# Patient Record
Sex: MALE | Race: WHITE | NOT HISPANIC OR LATINO | Employment: FULL TIME | URBAN - METROPOLITAN AREA
[De-identification: names, ages, dates, MRNs, and addresses within clinical notes are randomized per-mention and may not be internally consistent; named-entity substitution may affect disease eponyms.]

---

## 2017-10-09 ENCOUNTER — PATIENT OUTREACH (OUTPATIENT)
Dept: ADMINISTRATIVE | Facility: HOSPITAL | Age: 32
End: 2017-10-09

## 2017-10-31 ENCOUNTER — PATIENT OUTREACH (OUTPATIENT)
Dept: ADMINISTRATIVE | Facility: HOSPITAL | Age: 32
End: 2017-10-31

## 2017-10-31 NOTE — LETTER
October 31, 2017    Jamie Peter  [address] [facility] [address]             [facility]  [address]  [facility] [address]  Phone: [phone] Dear Mr. Peter:    We have tried to reach you by [facility] email unsuccessfully.    [facility] is committed to your overall health.  Our records indicate that you are due for an annual checkup with your primary care provider,  Dr. Wright.  Please call [phone] to schedule a routine physical exam. You may also be due for the following test and/or procedures:    Tetanus immunization  Influenza vaccine    If you have had any of the above done at another facility, please let us know by calling [phone] so that we can update your record.  We will add these results to your chart if you fax them to the fax number listed below.  If you have any questions, please call [phone].       Sincerely,    Deepa Lopez  Clinical Care Coordinator  [facility] [address] [place] [address]  Phone: [phone]   Fax: [phone]

## 2021-11-10 ENCOUNTER — OCCUPATIONAL HEALTH (OUTPATIENT)
Dept: URGENT CARE | Facility: CLINIC | Age: 36
End: 2021-11-10

## 2021-11-10 DIAGNOSIS — Z00.00 ENCOUNTER FOR PHYSICAL EXAMINATION: Primary | ICD-10-CM

## 2021-11-10 DIAGNOSIS — Z02.1 PRE-EMPLOYMENT EXAMINATION: Primary | ICD-10-CM

## 2021-11-10 LAB
CTP QC/QA: YES
POC 10 PANEL DRUG SCREEN: NEGATIVE

## 2021-11-10 PROCEDURE — 80305 DRUG TEST PRSMV DIR OPT OBS: CPT | Mod: S$GLB,,, | Performed by: PREVENTIVE MEDICINE

## 2021-11-10 PROCEDURE — 80305 POCT RAPID DRUG SCREEN 10 PANEL: ICD-10-PCS | Mod: S$GLB,,, | Performed by: PREVENTIVE MEDICINE

## 2021-11-10 PROCEDURE — 99499 UNLISTED E&M SERVICE: CPT | Mod: S$GLB,,, | Performed by: PREVENTIVE MEDICINE

## 2021-11-10 PROCEDURE — 99080 SPECIAL REPORTS OR FORMS: CPT | Mod: S$GLB,,, | Performed by: PREVENTIVE MEDICINE

## 2021-11-10 PROCEDURE — 99499 PHYSICAL, BASIC COMPLEXITY: ICD-10-PCS | Mod: S$GLB,,, | Performed by: PREVENTIVE MEDICINE

## 2021-11-10 PROCEDURE — 99080 OSHA QUESTIONNAIRE: ICD-10-PCS | Mod: S$GLB,,, | Performed by: PREVENTIVE MEDICINE

## 2021-11-19 ENCOUNTER — OCCUPATIONAL HEALTH (OUTPATIENT)
Dept: URGENT CARE | Facility: CLINIC | Age: 36
End: 2021-11-19

## 2021-11-19 DIAGNOSIS — Z02.1 PRE-EMPLOYMENT EXAMINATION: Primary | ICD-10-CM

## 2021-11-19 PROCEDURE — 94010 PULMONARY FUNCTION SCREENING (OCC MED PHYSICALS): ICD-10-PCS | Mod: S$GLB,,, | Performed by: PREVENTIVE MEDICINE

## 2021-11-19 PROCEDURE — 94010 BREATHING CAPACITY TEST: CPT | Mod: S$GLB,,, | Performed by: PREVENTIVE MEDICINE

## 2021-11-19 PROCEDURE — 97750 PHYSICAL PERFORMANCE TEST: CPT | Mod: S$GLB,,, | Performed by: PREVENTIVE MEDICINE

## 2021-11-19 PROCEDURE — 97750 PR AGILITY TEST: ICD-10-PCS | Mod: S$GLB,,, | Performed by: PREVENTIVE MEDICINE
